# Patient Record
Sex: MALE | Race: OTHER | Employment: UNEMPLOYED | URBAN - METROPOLITAN AREA
[De-identification: names, ages, dates, MRNs, and addresses within clinical notes are randomized per-mention and may not be internally consistent; named-entity substitution may affect disease eponyms.]

---

## 2017-12-04 ENCOUNTER — HOSPITAL ENCOUNTER (EMERGENCY)
Age: 57
Discharge: HOME | End: 2017-12-04
Attending: EMERGENCY MEDICINE
Payer: MEDICARE

## 2017-12-04 ENCOUNTER — APPOINTMENT (OUTPATIENT)
Dept: GENERAL RADIOLOGY | Age: 57
End: 2017-12-04
Attending: NURSE PRACTITIONER
Payer: MEDICARE

## 2017-12-04 VITALS
BODY MASS INDEX: 36.56 KG/M2 | WEIGHT: 213 LBS | DIASTOLIC BLOOD PRESSURE: 88 MMHG | TEMPERATURE: 97.8 F | SYSTOLIC BLOOD PRESSURE: 119 MMHG | HEART RATE: 81 BPM | RESPIRATION RATE: 16 BRPM | OXYGEN SATURATION: 95 %

## 2017-12-04 DIAGNOSIS — J20.9 ACUTE BRONCHITIS, UNSPECIFIED ORGANISM: Primary | ICD-10-CM

## 2017-12-04 PROCEDURE — 71020 XR CHEST PA LAT: CPT

## 2017-12-04 PROCEDURE — 99282 EMERGENCY DEPT VISIT SF MDM: CPT | Performed by: NURSE PRACTITIONER

## 2017-12-04 RX ORDER — CODEINE PHOSPHATE AND GUAIFENESIN 10; 100 MG/5ML; MG/5ML
5 SOLUTION ORAL
Qty: 60 ML | Refills: 0 | Status: SHIPPED | OUTPATIENT
Start: 2017-12-04 | End: 2018-01-24 | Stop reason: ALTCHOICE

## 2017-12-04 RX ORDER — AMOXICILLIN 500 MG/1
500 TABLET, FILM COATED ORAL 3 TIMES DAILY
Qty: 21 TAB | Refills: 0 | Status: SHIPPED | OUTPATIENT
Start: 2017-12-04 | End: 2018-01-24 | Stop reason: ALTCHOICE

## 2017-12-04 RX ORDER — FLUTICASONE PROPIONATE 50 MCG
2 SPRAY, SUSPENSION (ML) NASAL DAILY
Qty: 1 BOTTLE | Refills: 0 | Status: SHIPPED | OUTPATIENT
Start: 2017-12-04

## 2017-12-04 NOTE — DISCHARGE INSTRUCTIONS
Bronquitis: Instrucciones de cuidado - [ Bronchitis: Care Instructions ]  Instrucciones de cuidado    La bronquitis es lashell inflamación de los bronquios (conductos bronquiales), que llevan aire a los pulmones. Los tubos se hinchan y producen mucosidad, o flema. La mucosidad y los bronquios inflamados hacen que tosa. Es posible que tenga problemas para respirar. Madalynn Irene de los casos de bronquitis son causados por virus barb los que causan los resfriados. Los antibióticos generalmente no ayudan y pueden ser dañinos. La bronquitis suele aparecer con Doristine Lout y dura alrededor de 2 a 3 semanas en personas que por lo demás son saludables. La atención de seguimiento es lashell parte clave de porter tratamiento y seguridad. Asegúrese de hacer y acudir a todas las citas, y llame a porter médico si está teniendo problemas. También es lashell buena idea saber los resultados de los exámenes y mantener lashell lista de los medicamentos que сергей. ¿Cómo puede cuidarse en el hogar? · [employer] medicamentos exactamente barb le fueron recetados. Llame a porter médico si laz que está teniendo un problema con eduarda medicamentos. · Descanse un poco más. · [place] un analgésico (medicamento para el dolor) de venta terri, barb acetaminofén (Tylenol), ibuprofeno (Advil, Motrin) o naproxeno (Aleve), para reducir la fiebre y [employer] sujatha en el cuerpo. Anna y siga todas las instrucciones de la [place]. · No tome dos o más analgésicos al mismo tiempo a menos que el médico se lo haya indicado. Muchos analgésicos contienen acetaminofén, es decir, Tylenol. El exceso de acetaminofén (Tylenol) puede ser dañino. · Prosperity un medicamento para la tos de venta terri que contenga dextrometorfano para ayudar a aliviar la tos seca y persistente y así poder dormir. Evite los medicamentos para la tos que tengan más de un ingrediente [place]. Anna y siga todas las instrucciones de la [place].   · Respire aire húmedo de un humidificador, ducha caliente o lavabo lleno de [place]. El calor y la humedad adelgazarán la mucosidad para que pueda expulsarla. · No fume. Fumar puede empeorar la bronquitis. Si necesita ayuda para dejar de fumar, hable con porter médico sobre programas y medicamentos para dejar de fumar. Estos pueden aumentar eduarda probabilidades de dejar el hábito para siempre. ¿Cuándo debe pedir ayuda? Llame al 911 en cualquier momento que considere que necesita atención de emergencia. Por ejemplo, llame si:  ? · [address] dificultades para respirar. ? Llame a porter médico ahora mismo o busque atención médica inmediata si:  ? · Tiene nueva o peor dificultad para respirar. ? · Tose mucosidad (esputo) de color café oscuro o con elijah. ? · Tiene lashell fiebre nueva o más wandy. ? · Tiene un salpullido nuevo. ?Preste especial atención a los cambios en porter giovanny y asegúrese de comunicarse con porter médico si:  ? · Porter tos es más profunda o más frecuente que antes, especialmente si nota más mucosidad o un cambio en el color de la mucosidad. ? · No mejora barb se esperaba. ¿Dónde puede encontrar más información en inglés? Peter Polly a http://therese-sandra.info/. Escriba H333 en la búsqueda para aprender más acerca de \"Bronquitis: Instrucciones de cuidado - [ Bronchitis: Care Instructions ]. \"  Revisado: 12 jolly, 2017  Versión del contenido: 11.4  © 8596-1886 Healthwise, Incorporated. Las instrucciones de cuidado fueron adaptadas bajo licencia por [employer] (which disclaims liability or warranty for this information). Si usted tiene [place] afección médica o sobre estas instrucciones, siempre pregunte a porter profesional de giovanny. [facility] niega toda garantía o responsabilidad por porter uso de esta información.

## 2017-12-04 NOTE — PROGRESS NOTES
present to cover any requests in the Emergency Room. Thank you for this referral,      Yessica Toro, [address] [facility]  /Patient [facility].  [address] [place] [address]    [phone]

## 2017-12-04 NOTE — ED NOTES
I have reviewed discharge instructions with the patient. The patient verbalized understanding. Patient left ED via Discharge Method: ambulatory to Home with spouse. Opportunity for questions and clarification provided. Patient given 2 scripts. To continue your aftercare when you leave the hospital, you may receive an automated call from our care team to check in on how you are doing. This is a free service and part of our promise to provide the best care and service to meet your aftercare needs.  If you have questions, or wish to unsubscribe from this service please call [phone]. Thank you for Choosing our [facility].

## 2017-12-04 NOTE — ED PROVIDER NOTES
HPI Comments: Patient presents with cough, congestion, sore throat for the past week. He states back soreness from coughing. He states history of pneumonia. He denies fever. Patient is a 62 y.o. male presenting with cough. The history is provided by the patient. Cough   This is a new problem. The current episode started more than 1 week ago. The problem occurs constantly. The problem has not changed since onset. The cough is non-productive. There has been no fever. Associated symptoms include rhinorrhea, sore throat and myalgias. Pertinent negatives include no chest pain, no chills, no sweats, no weight loss, no eye redness, no ear congestion, no ear pain, no headaches, no shortness of breath, no wheezing, no nausea, no vomiting and no confusion. He has tried nothing for the symptoms. His past medical history is significant for pneumonia.         Past Medical History:   Diagnosis Date    Allergic rhinitis     Anxiety and depression     Asthma     Bronchiectasis ([address]     Claustrophobia     Constipation     Diabetes (HCC)     Dyslipidemia     Dyspnea     Gall bladder disease     GERD (gastroesophageal reflux disease)     Hepatic steatosis     Hiatal hernia     History of tobacco use     Hyperlipidemia     Hypertension     Insomnia     Obesity, unspecified     (BMI 30-39.9; WEIGHT MANAGEMENT)    Obstructive sleep apnea     Osteopenia     Pulmonary nodule     Sinusitis     Snoring     SOB (shortness of breath)     Urinary hesitancy        Past Surgical History:   Procedure Laterality Date    HX ABDOMINAL WALL DEFECT REPAIR  1975    knife wound repair    HX CHOLECYSTECTOMY  1/15/15         Family History:   Problem Relation Age of Onset    Diabetes Mother     Diabetes Father     Diabetes Brother     Asthma Daughter        Social History     Social History    Marital status:      Spouse name: N/A    Number of children: N/A    Years of education: N/A     Occupational History        Disabled, worked for 15 years as a  with no industrial toxin exposure     Social History Main Topics    Smoking status: Former Smoker     Packs/day: 0.50     Years: 10.00     Types: Cigarettes     Quit date: 1/14/1995    Smokeless tobacco: Never Used      Comment: QUIT ALMOST 21 YEARS AGO     Alcohol use No    Drug use: No    Sexual activity: Not on file     Other Topics Concern    Not on file     Social History Narrative    20-pack-year history of cigarette smoking, stopped smoking in 1995. Worked as a  for 15 years, denies industrial toxin exposure history. Presently on disability. , 2 children, one has asthma, also an adopted child. Denies alcohol use. He has lived in the [place] visiting from [address] [place] [address] at age 13, lives first in [place], [place] and now in [place]. Has chickens and ducks, no history of pet bird, has pet dog. ALLERGIES: Benzonatate and Pork/porcine containing products    Review of Systems   Constitutional: Negative for chills, fever and weight loss. HENT: Positive for congestion, rhinorrhea and sore throat. Negative for ear pain. Eyes: Negative for redness. Respiratory: Positive for cough. Negative for shortness of breath and wheezing. Cardiovascular: Negative for chest pain. Gastrointestinal: Negative for abdominal pain, constipation, diarrhea, nausea and vomiting. Musculoskeletal: Positive for myalgias. Negative for arthralgias. Neurological: Negative for dizziness, weakness and headaches. Psychiatric/Behavioral: Negative for confusion. Vitals:    12/04/17 1211   BP: 119/88   Pulse: 81   Resp: 16   Temp: 97.8 °F (36.6 °C)   SpO2: 95%   Weight: 96.6 kg (213 lb)            Physical Exam   Constitutional: He is oriented to person, place, and time. He appears well-developed and well-nourished. No distress. HENT:   Right Ear: Tympanic membrane is erythematous.  Tympanic membrane is not injected. A middle ear effusion is present. Left Ear: Tympanic membrane is erythematous. Tympanic membrane is not injected. A middle ear effusion is present. Nose: Mucosal edema present. Mouth/Throat: Uvula is midline, oropharynx is clear and moist and mucous membranes are normal.   Cardiovascular: Normal rate and regular rhythm. No murmur heard. Pulmonary/Chest: Effort normal and breath sounds normal.   Abdominal: Soft. Bowel sounds are normal.   Musculoskeletal: Normal range of motion. Neurological: He is alert and oriented to person, place, and time. Skin: Skin is warm and dry. No rash noted. He is not diaphoretic. No erythema. Psychiatric: He has a normal mood and affect. His behavior is normal.   Nursing note and vitals reviewed. Xr Chest Pa Lat    Result Date: 12/4/2017  Chest 2 view CLINICAL INDICATION: 2 weeks of persisting cough and chest pain radiating to back COMPARISON: 1/21/2015, also CT 2/17/2015 TECHNIQUE: Upright PA and lateral views of the chest FINDINGS: Lung volumes are well inflated. Cardiomediastinal silhouette and hilar contours are stable. The lungs demonstrating no consolidation, effusion, pneumothorax or pulmonary edema. There is minimal chronic linear scarring or atelectasis in the left base similar to prior. No acute osseous abnormalities are seen. IMPRESSION: No acute disease. MDM  Number of Diagnoses or Management Options  Acute bronchitis, unspecified organism:   Diagnosis management comments: Chest xray negative for pneumonia. Patient given prescriptions for Cheratussin, Flonase, and amoxicillin.         Amount and/or Complexity of Data Reviewed  Tests in the radiology section of CPT®: ordered and reviewed    Patient Progress  Patient progress: stable    ED Course       Procedures

## 2018-02-22 ENCOUNTER — HOSPITAL ENCOUNTER (EMERGENCY)
Age: 58
Discharge: HOME | End: 2018-02-22
Attending: EMERGENCY MEDICINE
Payer: MEDICARE

## 2018-02-22 ENCOUNTER — APPOINTMENT (OUTPATIENT)
Dept: GENERAL RADIOLOGY | Age: 58
End: 2018-02-22
Attending: EMERGENCY MEDICINE
Payer: MEDICARE

## 2018-02-22 VITALS
SYSTOLIC BLOOD PRESSURE: 130 MMHG | HEART RATE: 72 BPM | WEIGHT: 215 LBS | HEIGHT: 63 IN | DIASTOLIC BLOOD PRESSURE: 78 MMHG | BODY MASS INDEX: 38.09 KG/M2 | RESPIRATION RATE: 18 BRPM | TEMPERATURE: 98 F | OXYGEN SATURATION: 98 %

## 2018-02-22 DIAGNOSIS — J20.9 ACUTE BRONCHITIS, UNSPECIFIED ORGANISM: Primary | ICD-10-CM

## 2018-02-22 LAB
FLUAV AG NPH QL IA: NEGATIVE
FLUBV AG NPH QL IA: NEGATIVE

## 2018-02-22 PROCEDURE — 87804 INFLUENZA ASSAY W/OPTIC: CPT | Performed by: EMERGENCY MEDICINE

## 2018-02-22 PROCEDURE — 71046 X-RAY EXAM CHEST 2 VIEWS: CPT

## 2018-02-22 PROCEDURE — 99283 EMERGENCY DEPT VISIT LOW MDM: CPT | Performed by: EMERGENCY MEDICINE

## 2018-02-22 RX ORDER — GUAIFENESIN/DEXTROMETHORPHAN 100-10MG/5
5 SYRUP ORAL
Qty: 150 ML | Refills: 0 | Status: SHIPPED | OUTPATIENT
Start: 2018-02-22 | End: 2018-03-04

## 2018-02-22 RX ORDER — AMOXICILLIN AND CLAVULANATE POTASSIUM 875; 125 MG/1; MG/1
1 TABLET, FILM COATED ORAL 2 TIMES DAILY
Qty: 14 TAB | Refills: 0 | Status: SHIPPED | OUTPATIENT
Start: 2018-02-22 | End: 2018-03-01

## 2018-02-22 NOTE — DISCHARGE INSTRUCTIONS
Return with any fevers, vomiting, difficulty breathing, worsening symptoms, or distal concerns. It is important for you to follow up with your primary care doctor on Monday or Tuesday for reevaluation. Bronquitis: Instrucciones de cuidado - [ Bronchitis: Care Instructions ]  Instrucciones de cuidado    La bronquitis es lashell inflamación de los bronquios (conductos bronquiales), que llevan aire a los pulmones. Los tubos se hinchan y producen mucosidad, o flema. La mucosidad y los bronquios inflamados hacen que tosa. Es posible que tenga problemas para respirar. Maryhelen Rozina de los casos de bronquitis son causados por virus barb los que causan los resfriados. Los antibióticos generalmente no ayudan y pueden ser dañinos. La bronquitis suele aparecer con Aiyana Chough y dura alrededor de 2 a 3 semanas en personas que por lo demás son saludables. La atención de seguimiento es lashell parte clave de porter tratamiento y seguridad. Asegúrese de hacer y acudir a todas las citas, y llame a porter médico si está teniendo problemas. También es lashell buena idea saber los resultados de los exámenes y mantener lashell lista de los medicamentos que сергей. ¿Cómo puede cuidarse en el hogar? · [employer] medicamentos exactamente barb le fueron recetados. Llame a potrer médico si laz que está teniendo un problema con eduarda medicamentos. · Descanse un poco más. · [place] un analgésico (medicamento para el dolor) de venta terri, barb acetaminofén (Tylenol), ibuprofeno (Advil, Motrin) o naproxeno (Aleve), para reducir la fiebre y [employer] sujatha en el cuerpo. Anna y siga todas las instrucciones de la [place]. · No tome dos o más analgésicos al mismo tiempo a menos que el médico se lo haya indicado. Muchos analgésicos contienen acetaminofén, es decir, Tylenol. El exceso de acetaminofén (Tylenol) puede ser dañino.   · [place] un medicamento para la tos de venta terri que contenga dextrometorfano para ayudar a aliviar la tos seca y persistente y así poder dormir. Evite los medicamentos para la tos que tengan más de un ingrediente [place]. Anna y siga todas las instrucciones de la [place]. · Respire aire húmedo de un humidificador, ducha caliente o lavabo lleno de [place]. El calor y la humedad adelgazarán la mucosidad para que pueda expulsarla. · No fume. Fumar puede empeorar la bronquitis. Si necesita ayuda para dejar de fumar, hable con porter médico sobre programas y medicamentos para dejar de fumar. Estos pueden aumentar eduarda probabilidades de dejar el hábito para siempre. ¿Cuándo debe pedir ayuda? Llame al 911 en cualquier momento que considere que necesita atención de emergencia. Por ejemplo, llame si:  ? · [address] dificultades para respirar. ? Llame a porter médico ahora mismo o busque atención médica inmediata si:  ? · Tiene nueva o peor dificultad para respirar. ? · Tose mucosidad (esputo) de color café oscuro o con elijah. ? · Tiene lashell fiebre nueva o más wandy. ? · Tiene un salpullido nuevo. ?Preste especial atención a los cambios en porter giovanny y asegúrese de comunicarse con porter médico si:  ? · Porter tos es más profunda o más frecuente que antes, especialmente si nota más mucosidad o un cambio en el color de la mucosidad. ? · No mejora barb se esperaba. ¿Dónde puede encontrar más información en inglés? Joby Presume a http://[email or web address]-sandra.info/. Escriba H333 en la búsqueda para aprender más acerca de \"Bronquitis: Instrucciones de cuidado - [ Bronchitis: Care Instructions ]. \"  Revisado: 12 [place], 2017  Versión del contenido: 11.4  © 0855-9751 Healthwise, Incorporated. Las instrucciones de cuidado fueron adaptadas bajo licencia por [employer] (which disclaims liability or warranty for this information). Si usted tiene [place] afección médica o sobre estas instrucciones, siempre pregunte a porter profesional de giovanny.  Healthwise, Incorporated niega toda garantía o responsabilidad por porter uso de esta información.

## 2018-02-22 NOTE — ED TRIAGE NOTES
C/o generalized body aches, productive cough with white sputum, chills. States chest and back pain with cough. Denies n/v/d. Onset couple days pta.  States hx pneumonia couple years pta

## 2018-02-22 NOTE — ED PROVIDER NOTES
HPI Comments: 62year old gentleman presents with concerns about a cough and some body aches that have been present for about 4 or 5 days. He says his cough has been non-productive. He does note a few occasional fevers and chills. He's had no significant vomiting or diarrhea. Elements of this note were created using speech recognition software. As such, errors of speech recognition may be present. Patient is a 62 y.o. male presenting with general illness. The history is provided by the patient. Generalized Body Aches   Associated symptoms include shortness of breath. Pertinent negatives include no chest pain, no abdominal pain and no headaches.         Past Medical History:   Diagnosis Date    Allergic rhinitis     Anxiety and depression     Asthma     Bronchiectasis ([address]     Claustrophobia     Constipation     Diabetes (HCC)     Dyslipidemia     Dyspnea     Gall bladder disease     GERD (gastroesophageal reflux disease)     Hepatic steatosis     Hiatal hernia     History of tobacco use     Hyperlipidemia     Hypertension     Insomnia     Obesity, unspecified     (BMI 30-39.9; WEIGHT MANAGEMENT)    Obstructive sleep apnea     Osteopenia     Pulmonary nodule     Sinusitis     Snoring     SOB (shortness of breath)     Urinary hesitancy        Past Surgical History:   Procedure Laterality Date    HX ABDOMINAL WALL DEFECT REPAIR  1975    knife wound repair    HX CHOLECYSTECTOMY  1/15/15         Family History:   Problem Relation Age of Onset    Diabetes Mother     Diabetes Father     Diabetes Brother     Asthma Daughter        Social History     Social History    Marital status:      Spouse name: N/A    Number of children: N/A    Years of education: N/A     Occupational History          Disabled, worked for 15 years as a  with no industrial toxin exposure     Social History Main Topics    Smoking status: Former Smoker     Packs/day: 0.50 Years: 10.00     Types: Cigarettes     Quit date: 1/14/1995    Smokeless tobacco: Never Used      Comment: QUIT ALMOST 21 YEARS AGO     Alcohol use No    Drug use: No    Sexual activity: Not on file     Other Topics Concern    Not on file     Social History Narrative    20-pack-year history of cigarette smoking, stopped smoking in 1995. Worked as a  for 15 years, denies industrial toxin exposure history. Presently on disability. , 2 children, one has asthma, also an adopted child. Denies alcohol use. He has lived in the [place] visiting from [facility] at age 13, lives first in [place], [place] and now in [place]. Has chickens and ducks, no history of pet bird, has pet dog. ALLERGIES: Benzonatate and Pork/porcine containing products    Review of Systems   Constitutional: Negative for chills, diaphoresis and fever. HENT: Negative for congestion, rhinorrhea and sore throat. Eyes: Negative for redness and visual disturbance. Respiratory: Positive for cough and shortness of breath. Negative for chest tightness and wheezing. Cardiovascular: Negative for chest pain and palpitations. Gastrointestinal: Negative for abdominal pain, blood in stool, diarrhea, nausea and vomiting. Endocrine: Negative for polydipsia and polyuria. Genitourinary: Negative for dysuria and hematuria. Musculoskeletal: Positive for myalgias. Negative for arthralgias and neck stiffness. Skin: Negative for rash. Allergic/Immunologic: Negative for environmental allergies and food allergies. Neurological: Negative for dizziness, weakness and headaches. Hematological: Negative for adenopathy. Does not bruise/bleed easily. Psychiatric/Behavioral: Negative for confusion and sleep disturbance. The patient is not nervous/anxious.         Vitals:    02/22/18 1115 02/22/18 1235   BP: 127/87 130/78   Pulse: 73 72   Resp: 20 18   Temp: 97.9 °F (36.6 °C) 98 °F (36.7 °C)   SpO2: 95% 98%   Weight: 97.5 kg (215 lb)    Height: 5' 3\" (1.6 m)             Physical Exam   Constitutional: He is oriented to person, place, and time. He appears well-developed and well-nourished. HENT:   Head: Normocephalic and atraumatic. Eyes: Conjunctivae and EOM are normal. Pupils are equal, round, and reactive to light. Neck: Normal range of motion. Cardiovascular: Normal rate and regular rhythm. Pulmonary/Chest: Effort normal and breath sounds normal. No respiratory distress. He has no wheezes. He has no rales. He exhibits no tenderness. Abdominal: Soft. Bowel sounds are normal. There is no rebound and no guarding. Musculoskeletal: Normal range of motion. He exhibits no edema or tenderness. Lymphadenopathy:     He has no cervical adenopathy. Neurological: He is alert and oriented to person, place, and time. Skin: Skin is warm and dry. Psychiatric: He has a normal mood and affect. Nursing note and vitals reviewed. MDM  Number of Diagnoses or Management Options  Acute bronchitis, unspecified organism:   Diagnosis management comments: Patient's symptoms seem more like a bronchitis than the flu. I will treat for that.         ED Course       Procedures

## 2018-02-22 NOTE — ED NOTES
I have reviewed discharge instructions with the patient. The patient verbalized understanding. Patient left ED via Discharge Method: ambulatory to home with (wife). Opportunity for questions and clarification provided. Patient given 2 escripts. To continue your aftercare when you leave the hospital, you may receive an automated call from our care team to check in on how you are doing. This is a free service and part of our promise to provide the best care and service to meet your aftercare needs.  If you have questions, or wish to unsubscribe from this service please call [phone]. Thank you for Choosing our [facility] Emergency Department.

## 2018-07-03 ENCOUNTER — APPOINTMENT (OUTPATIENT)
Dept: GENERAL RADIOLOGY | Age: 58
End: 2018-07-03
Attending: EMERGENCY MEDICINE
Payer: MEDICARE

## 2018-07-03 ENCOUNTER — HOSPITAL ENCOUNTER (EMERGENCY)
Age: 58
Discharge: HOME | End: 2018-07-03
Attending: EMERGENCY MEDICINE
Payer: MEDICARE

## 2018-07-03 VITALS
BODY MASS INDEX: 38.09 KG/M2 | DIASTOLIC BLOOD PRESSURE: 71 MMHG | TEMPERATURE: 97.6 F | SYSTOLIC BLOOD PRESSURE: 117 MMHG | HEART RATE: 78 BPM | RESPIRATION RATE: 18 BRPM | OXYGEN SATURATION: 93 % | HEIGHT: 63 IN | WEIGHT: 215 LBS

## 2018-07-03 DIAGNOSIS — R42 DIZZINESS: ICD-10-CM

## 2018-07-03 DIAGNOSIS — R11.2 NAUSEA AND VOMITING, INTRACTABILITY OF VOMITING NOT SPECIFIED, UNSPECIFIED VOMITING TYPE: Primary | ICD-10-CM

## 2018-07-03 LAB
ALBUMIN SERPL-MCNC: 4 G/DL (ref 3.5–5)
ALBUMIN/GLOB SERPL: 1 {RATIO} (ref 1.2–3.5)
ALP SERPL-CCNC: 89 U/L (ref 50–136)
ALT SERPL-CCNC: 60 U/L (ref 12–65)
ANION GAP SERPL CALC-SCNC: 10 MMOL/L (ref 7–16)
AST SERPL-CCNC: 28 U/L (ref 15–37)
BASOPHILS # BLD: 0 K/UL (ref 0–0.2)
BASOPHILS NFR BLD: 1 % (ref 0–2)
BILIRUB SERPL-MCNC: 0.5 MG/DL (ref 0.2–1.1)
BUN SERPL-MCNC: 12 MG/DL (ref 6–23)
CALCIUM SERPL-MCNC: 8.9 MG/DL (ref 8.3–10.4)
CHLORIDE SERPL-SCNC: 106 MMOL/L (ref 98–107)
CO2 SERPL-SCNC: 26 MMOL/L (ref 21–32)
CREAT SERPL-MCNC: 0.88 MG/DL (ref 0.8–1.5)
DIFFERENTIAL METHOD BLD: ABNORMAL
EOSINOPHIL # BLD: 0.2 K/UL (ref 0–0.8)
EOSINOPHIL NFR BLD: 2 % (ref 0.5–7.8)
ERYTHROCYTE [DISTWIDTH] IN BLOOD BY AUTOMATED COUNT: 13.2 % (ref 11.9–14.6)
GLOBULIN SER CALC-MCNC: 4.2 G/DL (ref 2.3–3.5)
GLUCOSE SERPL-MCNC: 102 MG/DL (ref 65–100)
HCT VFR BLD AUTO: 47.9 % (ref 41.1–50.3)
HGB BLD-MCNC: 17 G/DL (ref 13.6–17.2)
IMM GRANULOCYTES # BLD: 0 K/UL (ref 0–0.5)
IMM GRANULOCYTES NFR BLD AUTO: 0 % (ref 0–5)
LIPASE SERPL-CCNC: 112 U/L (ref 73–393)
LYMPHOCYTES # BLD: 2 K/UL (ref 0.5–4.6)
LYMPHOCYTES NFR BLD: 31 % (ref 13–44)
MCH RBC QN AUTO: 31.5 PG (ref 26.1–32.9)
MCHC RBC AUTO-ENTMCNC: 35.5 G/DL (ref 31.4–35)
MCV RBC AUTO: 88.7 FL (ref 79.6–97.8)
MONOCYTES # BLD: 0.5 K/UL (ref 0.1–1.3)
MONOCYTES NFR BLD: 8 % (ref 4–12)
NEUTS SEG # BLD: 3.7 K/UL (ref 1.7–8.2)
NEUTS SEG NFR BLD: 58 % (ref 43–78)
PLATELET # BLD AUTO: 281 K/UL (ref 150–450)
PMV BLD AUTO: 9.6 FL (ref 10.8–14.1)
POTASSIUM SERPL-SCNC: 3.3 MMOL/L (ref 3.5–5.1)
PROT SERPL-MCNC: 8.2 G/DL (ref 6.3–8.2)
RBC # BLD AUTO: 5.4 M/UL (ref 4.23–5.67)
SODIUM SERPL-SCNC: 142 MMOL/L (ref 136–145)
WBC # BLD AUTO: 6.4 K/UL (ref 4.3–11.1)

## 2018-07-03 PROCEDURE — 83690 ASSAY OF LIPASE: CPT | Performed by: EMERGENCY MEDICINE

## 2018-07-03 PROCEDURE — 85025 COMPLETE CBC W/AUTO DIFF WBC: CPT | Performed by: EMERGENCY MEDICINE

## 2018-07-03 PROCEDURE — 74019 RADEX ABDOMEN 2 VIEWS: CPT

## 2018-07-03 PROCEDURE — 81003 URINALYSIS AUTO W/O SCOPE: CPT | Performed by: EMERGENCY MEDICINE

## 2018-07-03 PROCEDURE — 74011250636 HC RX REV CODE- 250/636: Performed by: EMERGENCY MEDICINE

## 2018-07-03 PROCEDURE — 80053 COMPREHEN METABOLIC PANEL: CPT | Performed by: EMERGENCY MEDICINE

## 2018-07-03 PROCEDURE — 74011000250 HC RX REV CODE- 250: Performed by: EMERGENCY MEDICINE

## 2018-07-03 PROCEDURE — 96360 HYDRATION IV INFUSION INIT: CPT | Performed by: EMERGENCY MEDICINE

## 2018-07-03 PROCEDURE — 99284 EMERGENCY DEPT VISIT MOD MDM: CPT | Performed by: EMERGENCY MEDICINE

## 2018-07-03 PROCEDURE — 74011250637 HC RX REV CODE- 250/637: Performed by: EMERGENCY MEDICINE

## 2018-07-03 RX ORDER — LIDOCAINE HYDROCHLORIDE 20 MG/ML
15 SOLUTION OROPHARYNGEAL
Status: COMPLETED | OUTPATIENT
Start: 2018-07-03 | End: 2018-07-03

## 2018-07-03 RX ORDER — DICYCLOMINE HYDROCHLORIDE 10 MG/1
10 CAPSULE ORAL 4 TIMES DAILY
Qty: 20 CAP | Refills: 0 | Status: SHIPPED | OUTPATIENT
Start: 2018-07-03 | End: 2018-07-08

## 2018-07-03 RX ORDER — ONDANSETRON 4 MG/1
4 TABLET, ORALLY DISINTEGRATING ORAL
Qty: 12 TAB | Refills: 0 | Status: SHIPPED | OUTPATIENT
Start: 2018-07-03

## 2018-07-03 RX ADMIN — LIDOCAINE HYDROCHLORIDE 15 ML: 20 SOLUTION ORAL; TOPICAL at 17:57

## 2018-07-03 RX ADMIN — SODIUM CHLORIDE 1000 ML: 900 INJECTION, SOLUTION INTRAVENOUS at 18:28

## 2018-07-03 RX ADMIN — Medication 30 ML: at 17:57

## 2018-07-03 NOTE — ED NOTES
I have reviewed discharge instructions with the patient and spouse. The patient and spouse verbalized understanding. Patient left ED via Discharge Method: ambulatory to Home with transport from wife. The patient is ambulatory upon exit and appears in no acute distress. The patient has been provided discharge instructions, follow up information, and prescriptions x2. The patient and wife do not have any questions at this time. Opportunity for questions and clarification provided. Patient given 2 scripts. To continue your aftercare when you leave the hospital, you may receive an automated call from our care team to check in on how you are doing. This is a free service and part of our promise to provide the best care and service to meet your aftercare needs.  If you have questions, or wish to unsubscribe from this service please call [phone]. Thank you for Choosing our [facility] Emergency Department.

## 2018-07-03 NOTE — DISCHARGE INSTRUCTIONS
Mareos: Instrucciones de cuidado - [ Dizziness: Care Instructions ]  Instrucciones de cuidado  Los mareos son [place] sensación de inestabilidad o confusión en la morena. Son distintos al vértigo, lashell sensación de que la habitación gira o de que usted se mueve o . También es distinto del aturdimiento, que es la sensación de que está a punto de desmayarse. Puede resultar difícil conocer la causa de los [place]. Algunas personas se sienten mareadas cuando tienen migrañas. A veces, los episodios gripales pueden hacer que se sienta mareado. Algunas afecciones médicas, barb los problemas cardíacos o la presión arterial wandy, pueden hacer que se sienta mareado. Muchos medicamentos pueden causar mareos, barb los que se usan para la presión arterial wandy, el dolor o la ansiedad. Si es un medicamento el que está causando los síntomas, porter médico podría recomendarle que lo cambie o deje de tomarlo. Si es un problema cardíaco, podría necesitar medicamentos para ayudar a que porter corazón funcione mejor. Si no hay razón aparente para los síntomas, porter médico podría sugerir vigilar y esperar eun un tiempo para perfecto si los mareos desaparecen por sí solos. La atención de seguimiento es lashell parte clave de porter tratamiento y seguridad. Asegúrese de hacer y acudir a todas las citas, y llame a porter médico si está teniendo problemas. También es lashell buena idea saber los resultados de los exámenes y mantener lashell lista de los medicamentos que сергей. ¿Cómo puede cuidarse en el hogar? · Si porter médico le recomienda o receta medicamentos, tómelos exactamente según las indicaciones. Llame a porter médico si laz estar teniendo un problema con porter medicamento. · No conduzca mientras se sienta mareado. · Trate de prevenir las caídas. Algunas medidas que puede gene son:  Merline Garre Usar tapetes antideslizantes, agregar agarraderas cerca de la isa y usar luces nocturnas.   ¨ Ordenar porter casa de jacki manera que en los senderos no haya nada con lo que se pueda tropezar. ¨ Avisarles a familiares y [facility] que se ha estado sintiendo [place]. Guin les servirá para saber cómo ayudarle. ¿Cuándo debe pedir ayuda? Llame al 911 en cualquier momento que considere que necesita atención de [place]. Por ejemplo, llame si:  ? · Se desmayó (perdió el conocimiento). ? · Tiene mareos junto con síntomas de un ataque cardíaco. Estos pueden incluir:  ¨ Dolor de pecho, o presión o lashell sensación extraña en el pecho. ¨ Sudoración. ¨ Falta de aire. ¨ Náuseas o vómito. ¨ Dolor, presión, o lashell sensación extraña en la espalda, el elisha, la mandíbula o el abdomen superior, o en honorio o ambos hombros o brazos. ¨ Aturdimiento o debilidad repentina. ¨ Un latido cardíaco rápido o irregular. ? · Tiene síntomas de un ataque cerebral. Estos pueden incluir:  ¨ Entumecimiento, hormigueo, debilidad o parálisis repentinos en la miguel, el brazo o la pierna, sobre todo si ocurre en un solo lado del cuerpo. ¨ Cambios súbitos en la vista. ¨ Problemas repentinos para hablar. ¨ Confusión súbita o dificultad repentina para comprender frases sencillas. ¨ Problemas repentinos para caminar o mantener el equilibrio. ¨ Un dolor de morena intenso y repentino, distinto a los sujatha de morena anteriores. ?Llame a porter médico ahora mismo o busque atención médica inmediata si:  ? · Se siente mareado y tiene Lizzieocłdamon, laquita de Martine o zumbido [place] oídos. ? · Tiene nuevas náuseas y vómito o [address] ? · Rafia mareos no desaparecen o regresan. ?Preste especial atención a los cambios en porter giovanny y asegúrese de comunicarse con porter médico si:  ? · No mejora barb se esperaba. ¿Dónde puede encontrar más información en inglés? [place] a http://therese-sandra.info/. Ami Etienne [id number] en la búsqueda para aprender más acerca de \"Mareos: Instrucciones de cuidado - [ Dizziness: Care Instructions ]. \"  Revisado: 20 Richard Dallas 2017  Versión del contenido: 11.4  © 3136-0169 Healthwise, Incorporated.  Taras Hong instrucciones de cuidado fueron adaptadas bajo licencia por [facility] (which disclaims liability or warranty for this information). Si usted tiene [place] afección médica o sobre estas instrucciones, siempre pregunte a porter profesional de giovanny. [facility] niega toda garantía o responsabilidad por porter uso de esta información. Dolor abdominal: Instrucciones de cuidado - [ Abdominal Pain: Care Instructions ]  Instrucciones de cuidado    El dolor abdominal tiene muchas causas posibles. Algunas de ellas no son graves y mejoran por sí solas en unos días. Otras requieren Radha Mara y [place]. Si porter dolor continúa o [place], necesitará lashell nueva revisión y [place] pruebas para determinar qué pasa. Es posible que necesite cirugía para corregir el problema. No ignore nuevos síntomas, barb fiebre, náuseas y [place], [facility], dolor que [place] o [place]. Podrían ser señales de un problema más grave. Porter médico puede haberle recomendado lashell consulta de Rubio [employer] Kari las 8 o 12 horas siguientes. Si no se siente mejor, es posible que requiera Radha [place] o [place]. El médico lo martínez revisado minuciosamente, zoltan puede carol problemas más tarde. Si nota algún problema o síntomas nuevos, busque tratamiento médico inmediatamente. La atención de seguimiento es lashell parte clave de porter tratamiento y seguridad. Asegúrese de hacer y acudir a todas las citas, y llame a porter médico si está teniendo problemas. También es lashell buena idea saber los resultados de los exámenes y mantener lashell lista de los medicamentos que сергей. ¿Cómo puede cuidarse en el hogar? · Descanse hasta que se sienta mejor. · Para prevenir la deshidratación, kerry abundantes líquidos, suficientes para que porter orina sea de color amarillo marin o transparente barb el agua. Elija beber agua y otros líquidos mag sin cafeína hasta que se sienta mejor.  Si tiene [employer], del corazón o del hígado y tiene que Sue's líquidos, hable con porter médico antes de aumentar porter consumo. · Si tiene [employer], coma alimentos suaves, barb arroz, pan tarun seco o galletas saladas, bananas (plátanos) y puré de Synchari. Trate de comer varias comidas pequeñas al día en lugar de dos o destinee grandes. · Espere hasta 48 horas después de que todos los síntomas hayan desaparecido antes de comer alimentos condimentados, alcohol y bebidas que contengan cafeína. · No consuma alimentos ricos en grasa. · Evite medicamentos antiinflamatorios barb aspirina, ibuprofeno (Advil, Motrin) y naproxeno (Aleve). Pueden causar [employer]. Dígale a porter médico si está tomando aspirina diariamente debido a otro problema de giovanny. ¿Cuándo debe pedir ayuda? Llame al 911 en cualquier momento que considere que necesita atención de emergencia. Por ejemplo, llame si:  ? · Se desmayó (perdió el conocimiento). ? · Las heces son de color rojizo o muy sanguinolentas (con elijah). ? · Vomita elijah o algo parecido a granos de café molido. ? · Tiene dolor abdominal nuevo e intenso. ? Llame a porter médico ahora mismo o busque atención médica inmediata si:  ? · Porter dolor empeora, sobre todo si se concentra en lashell kandis parte del vientre. ? · Vuelve a tener fiebre o tiene fiebre más wandy. ? · Rafia heces son negruzcas y parecidas al alquitrán o tienen rastros de [place]. ? · Tiene sangrado vaginal inesperado. ? · Tiene síntomas de lashell infección del tracto urinario. Estos podrían incluir:  ¨ Dolor al Valentin. ¨ Orinar con más frecuencia que lo habitual.  ¨ Elijah en la Stella nunn. ? · [employer] o aturdimiento, o que está a punto de [place]. ?Preste especial atención a los cambios en porter giovanny y asegúrese de comunicarse con porter médico si:  ? · No está mejorando después de 1 día (24 horas). ¿Dónde puede encontrar más información en inglés? Isma Safer a http://therese-sandra.[email or web address]   Aaron Jansen [id number] en la búsqueda para aprender más acerca de \"Dolor abdominal: Instrucciones de cuidado - [ Abdominal Pain: Care Instructions ]. \"  Revisado: 20 Karen Michael 2017  Versión del contenido: 11.4  © 2709-7411 Healthwise, Incorporated. Las instrucciones de cuidado fueron adaptadas bajo licencia por [facility] (which disclaims liability or warranty for this information). Si usted tiene [place] afección médica o sobre estas instrucciones, siempre pregunte a porter profesional de giovanny. Healthwise, Incorporated niega toda garantía o responsabilidad por porter uso de esta información. Nausea and Vomiting: Care Instructions  Your Care Instructions    When you are nauseated, you may feel weak and sweaty and notice a lot of saliva in your mouth. Nausea often leads to vomiting. Most of the time you do not need to worry about nausea and vomiting, but they can be signs of other illnesses. Two common causes of nausea and vomiting are stomach flu and food poisoning. Nausea and vomiting from viral stomach flu will usually start to improve within 24 hours. Nausea and vomiting from food poisoning may last from 12 to 48 hours. The doctor has checked you carefully, but problems can develop later. If you notice any problems or new symptoms, get medical treatment right away. Follow-up care is a key part of your treatment and safety. Be sure to make and go to all appointments, and call your doctor if you are having problems. It's also a good idea to know your test results and keep a list of the medicines you take. How can you care for yourself at home? · To prevent dehydration, drink plenty of fluids, enough so that your urine is light yellow or clear like water. Choose water and other caffeine-free clear liquids until you feel better. If you have kidney, heart, or liver disease and have to limit fluids, talk with your doctor before you increase the amount of fluids you drink. · Rest in bed until you feel better.   · When you are able to eat, try clear soups, mild foods, and liquids until all symptoms are gone for 12 to 48 hours. Other good choices include dry toast, crackers, cooked cereal, and gelatin dessert, such as Jell-O. When should you call for help? Call 911 anytime you think you may need emergency care. For example, call if:  ? · You passed out (lost consciousness). ?Call your doctor now or seek immediate medical care if:  ? · You have symptoms of dehydration, such as:  ¨ Dry eyes and a dry mouth. ¨ Passing only a little dark urine. ¨ Feeling thirstier than usual.   ? · You have new or worsening belly pain. ? · You have a new or higher fever. ? · You vomit blood or what looks like coffee grounds. ? Watch closely for changes in your health, and be sure to contact your doctor if:  ? · You have ongoing nausea and vomiting. ? · Your vomiting is getting worse. ? · Your vomiting lasts longer than 2 days. ? · You are not getting better as expected. Where can you learn more? Go to http://therese-sandra.info/. Enter [phone] [id number] in the search box to learn more about \"Nausea and Vomiting: Care Instructions. \"  Current as of: March 20, 2017  Content Version: 11.4  © 9663-4442 Healthwise, Incorporated. Care instructions adapted under license by [employer] (which disclaims liability or warranty for this information). If you have questions about a medical condition or this instruction, always ask your healthcare professional. [place] [address] any warranty or liability for your use of this information.

## 2018-07-03 NOTE — ED PROVIDER NOTES
HPI Comments: 63 yo male had a hamburger on Friday and then had multiple episodes of vomiting. Over the weakend he he has had diarrhea and then some abdominal pain. Pain is 8/10. He did take imodium yesterday which helped the diarrhea. Has had cholecystectomy in the past.        Patient is a 62 y.o. male presenting with abdominal pain. The history is provided by the patient. Abdominal Pain    Associated symptoms include diarrhea, nausea and vomiting. Pertinent negatives include no fever, no constipation and no chest pain.         Past Medical History:   Diagnosis Date    Allergic rhinitis     Anxiety and depression     Asthma     Bronchiectasis ([address]     Claustrophobia     Constipation     Diabetes (HCC)     Dyslipidemia     Dyspnea     Gall bladder disease     GERD (gastroesophageal reflux disease)     Hepatic steatosis     Hiatal hernia     History of tobacco use     Hyperlipidemia     Hypertension     Insomnia     Obesity, unspecified     (BMI 30-39.9; WEIGHT MANAGEMENT)    Obstructive sleep apnea     Osteopenia     Pulmonary nodule     Sinusitis     Snoring     SOB (shortness of breath)     Urinary hesitancy        Past Surgical History:   Procedure Laterality Date    HX ABDOMINAL WALL DEFECT REPAIR  1975    knife wound repair    HX CHOLECYSTECTOMY  1/15/15         Family History:   Problem Relation Age of Onset    Diabetes Mother     Diabetes Father     Diabetes Brother     Asthma Daughter        Social History     Social History    Marital status:      Spouse name: N/A    Number of children: N/A    Years of education: N/A     Occupational History          Disabled, worked for 15 years as a  with no industrial toxin exposure     Social History Main Topics    Smoking status: Former Smoker     Packs/day: 0.50     Years: 10.00     Types: Cigarettes     Quit date: 1/14/1995    Smokeless tobacco: Never Used      Comment: QUIT ALMOST 20 YEARS AGO     Alcohol use No    Drug use: No    Sexual activity: Not on file     Other Topics Concern    Not on file     Social History Narrative    20-pack-year history of cigarette smoking, stopped smoking in 1995. Worked as a  for 15 years, denies industrial toxin exposure history. Presently on disability. , 2 children, one has asthma, also an adopted child. Denies alcohol use. He has lived in the [facility] [place] from [facility] at age 13, lives first in [place], [place] and now in [place]. Has chickens and ducks, no history of pet bird, has pet dog. ALLERGIES: Benzonatate and Pork/porcine containing products    Review of Systems   Constitutional: Negative for chills and fever. Respiratory: Negative for cough, chest tightness and wheezing. Cardiovascular: Negative for chest pain and palpitations. Gastrointestinal: Positive for abdominal pain, diarrhea, nausea and vomiting. Negative for abdominal distention, anal bleeding, blood in stool and constipation. Skin: Negative. All other systems reviewed and are negative. Vitals:    07/03/18 1551 07/03/18 1707 07/03/18 1758 07/03/18 1829   BP: 123/84  110/71 111/74   Pulse: 78      Resp: 18      Temp: 97.6 °F (36.4 °C)      SpO2: 97% 96% 93% 93%   Weight: 97.5 kg (215 lb)      Height: 5' 3\" (1.6 m)               Physical Exam   Constitutional: He is oriented to person, place, and time. He appears well-developed and well-nourished. No distress. HENT:   Head: Normocephalic and atraumatic. Eyes: Conjunctivae are normal. Right eye exhibits no discharge. Left eye exhibits no discharge. Neck: Neck supple. Pulmonary/Chest: Effort normal. No stridor. No respiratory distress. Abdominal: Soft. He exhibits no distension. There is tenderness (epigastric region. ). There is no rebound and no guarding. Neurological: He is alert and oriented to person, place, and time.    No focal weakness speech normal Skin: Skin is warm and dry. No rash noted. He is not diaphoretic. No erythema. Psychiatric: He has a normal mood and affect. His behavior is normal.   Nursing note and vitals reviewed. MDM  Number of Diagnoses or Management Options  Diagnosis management comments: ddx includes gastroenteritis, pancreatitis, gastritis Patient xray and labs are normal.  hhe is feeling somewhat better on reevaluation. He wants to go home I discussed CT scan would be needed to definitively rule anything out further and to return if he gets worse. Nikky Mcdaniel MD; 7/3/2018 @7:12 PM Voice dictation software was used during the making of this note. This software is not perfect and grammatical and other typographical errors may be present.   This note has not been proofread for errors.  ===================================================================         ED Course       Procedures

## 2018-09-04 ENCOUNTER — HOSPITAL ENCOUNTER (EMERGENCY)
Age: 58
Discharge: HOME | End: 2018-09-04
Attending: EMERGENCY MEDICINE
Payer: MEDICARE

## 2018-09-04 ENCOUNTER — APPOINTMENT (OUTPATIENT)
Dept: GENERAL RADIOLOGY | Age: 58
End: 2018-09-04
Attending: EMERGENCY MEDICINE
Payer: MEDICARE

## 2018-09-04 VITALS
RESPIRATION RATE: 18 BRPM | OXYGEN SATURATION: 98 % | TEMPERATURE: 98 F | SYSTOLIC BLOOD PRESSURE: 131 MMHG | DIASTOLIC BLOOD PRESSURE: 71 MMHG | BODY MASS INDEX: 34.15 KG/M2 | WEIGHT: 200 LBS | HEART RATE: 71 BPM | HEIGHT: 64 IN

## 2018-09-04 DIAGNOSIS — J20.9 ACUTE BRONCHITIS, UNSPECIFIED ORGANISM: Primary | ICD-10-CM

## 2018-09-04 LAB
ALBUMIN SERPL-MCNC: 3.6 G/DL (ref 3.5–5)
ALBUMIN/GLOB SERPL: 0.8 {RATIO} (ref 1.2–3.5)
ALP SERPL-CCNC: 85 U/L (ref 50–136)
ALT SERPL-CCNC: 54 U/L (ref 12–65)
ANION GAP SERPL CALC-SCNC: 9 MMOL/L (ref 7–16)
AST SERPL-CCNC: 30 U/L (ref 15–37)
BASOPHILS # BLD: 0.1 K/UL (ref 0–0.2)
BASOPHILS NFR BLD: 1 % (ref 0–2)
BILIRUB SERPL-MCNC: 0.3 MG/DL (ref 0.2–1.1)
BUN SERPL-MCNC: 8 MG/DL (ref 6–23)
CALCIUM SERPL-MCNC: 9 MG/DL (ref 8.3–10.4)
CHLORIDE SERPL-SCNC: 102 MMOL/L (ref 98–107)
CO2 SERPL-SCNC: 29 MMOL/L (ref 21–32)
CREAT SERPL-MCNC: 0.7 MG/DL (ref 0.8–1.5)
DIFFERENTIAL METHOD BLD: NORMAL
EOSINOPHIL # BLD: 0.2 K/UL (ref 0–0.8)
EOSINOPHIL NFR BLD: 2 % (ref 0.5–7.8)
ERYTHROCYTE [DISTWIDTH] IN BLOOD BY AUTOMATED COUNT: 13.2 %
FLUAV AG NPH QL IA: NEGATIVE
FLUBV AG NPH QL IA: NEGATIVE
GLOBULIN SER CALC-MCNC: 4.6 G/DL (ref 2.3–3.5)
GLUCOSE SERPL-MCNC: 97 MG/DL (ref 65–100)
HCT VFR BLD AUTO: 48.3 % (ref 41.1–50.3)
HGB BLD-MCNC: 16.3 G/DL (ref 13.6–17.2)
IMM GRANULOCYTES # BLD: 0 K/UL (ref 0–0.5)
IMM GRANULOCYTES NFR BLD AUTO: 0 % (ref 0–5)
LYMPHOCYTES # BLD: 1.9 K/UL (ref 0.5–4.6)
LYMPHOCYTES NFR BLD: 24 % (ref 13–44)
MCH RBC QN AUTO: 30.5 PG (ref 26.1–32.9)
MCHC RBC AUTO-ENTMCNC: 33.7 G/DL (ref 31.4–35)
MCV RBC AUTO: 90.4 FL (ref 79.6–97.8)
MONOCYTES # BLD: 0.7 K/UL (ref 0.1–1.3)
MONOCYTES NFR BLD: 9 % (ref 4–12)
NEUTS SEG # BLD: 5 K/UL (ref 1.7–8.2)
NEUTS SEG NFR BLD: 64 % (ref 43–78)
NRBC # BLD: 0 K/UL (ref 0–0.2)
PLATELET # BLD AUTO: 252 K/UL (ref 150–450)
PMV BLD AUTO: 9.6 FL (ref 9.4–12.3)
POTASSIUM SERPL-SCNC: 3.9 MMOL/L (ref 3.5–5.1)
PROT SERPL-MCNC: 8.2 G/DL (ref 6.3–8.2)
RBC # BLD AUTO: 5.34 M/UL (ref 4.23–5.6)
SODIUM SERPL-SCNC: 140 MMOL/L (ref 136–145)
SPECIMEN SOURCE: NORMAL
WBC # BLD AUTO: 7.9 K/UL (ref 4.3–11.1)

## 2018-09-04 PROCEDURE — 99284 EMERGENCY DEPT VISIT MOD MDM: CPT | Performed by: NURSE PRACTITIONER

## 2018-09-04 PROCEDURE — 87804 INFLUENZA ASSAY W/OPTIC: CPT

## 2018-09-04 PROCEDURE — 80053 COMPREHEN METABOLIC PANEL: CPT

## 2018-09-04 PROCEDURE — 71046 X-RAY EXAM CHEST 2 VIEWS: CPT

## 2018-09-04 PROCEDURE — 85025 COMPLETE CBC W/AUTO DIFF WBC: CPT

## 2018-09-04 PROCEDURE — 81003 URINALYSIS AUTO W/O SCOPE: CPT | Performed by: NURSE PRACTITIONER

## 2018-09-04 RX ORDER — AMOXICILLIN AND CLAVULANATE POTASSIUM 875; 125 MG/1; MG/1
1 TABLET, FILM COATED ORAL 2 TIMES DAILY
Qty: 14 TAB | Refills: 0 | Status: SHIPPED | OUTPATIENT
Start: 2018-09-04 | End: 2020-07-02

## 2018-09-04 RX ORDER — GUAIFENESIN DEXTROMETHORPHAN HYDROBROMIDE ORAL SOLUTION 10; 100 MG/5ML; MG/5ML
10 SOLUTION ORAL
Qty: 118 ML | Refills: 0 | Status: SHIPPED | OUTPATIENT
Start: 2018-09-04 | End: 2018-09-11

## 2018-09-04 NOTE — ED TRIAGE NOTES
Pt states having body aches, back pain and a cough that started Saturday night. Pt states since yesterday has not had a taste for any food. Pt states having a productive cough.

## 2018-09-04 NOTE — ED PROVIDER NOTES
HPI Comments: Patient presents with cough, congestion, generalized body aches, and fatigue for the past 3 days. He denies fever. The history is provided by the patient. Past Medical History:  
Diagnosis Date  Allergic rhinitis  Anxiety and depression  Asthma  Bronchiectasis ([address]  Claustrophobia  Constipation  Diabetes ([address]  Dyslipidemia  Dyspnea  Gall bladder disease  GERD (gastroesophageal reflux disease)  Hepatic steatosis  Hiatal hernia  History of tobacco use  Hyperlipidemia  Hypertension  Insomnia  Obesity, unspecified (BMI 30-39.9; WEIGHT MANAGEMENT)  Obstructive sleep apnea  Osteopenia  Pulmonary nodule  Sinusitis  Snoring  SOB (shortness of breath)  Urinary hesitancy Past Surgical History:  
Procedure Laterality Date  HX ABDOMINAL WALL DEFECT REPAIR  1975  
 knife wound repair  HX CHOLECYSTECTOMY  1/15/15 Family History:  
Problem Relation Age of Onset  Diabetes Mother  Diabetes Father  Diabetes Brother  Asthma Daughter Social History Social History  Marital status:  Spouse name: N/A  
 Number of children: N/A  
 Years of education: N/A Occupational History   Disabled, worked for 15 years as a  with no industrial toxin exposure Social History Main Topics  Smoking status: Former Smoker Packs/day: 0.50 Years: 10.00 Types: Cigarettes Quit date: 1/14/1995  Smokeless tobacco: Never Used Comment: QUIT ALMOST 20 YEARS AGO  Alcohol use No  
 Drug use: No  
 Sexual activity: Not on file Other Topics Concern  Not on file Social History Narrative 20-pack-year history of cigarette smoking, stopped smoking in 1995. Worked as a  for 15 years, denies industrial toxin exposure history. Presently on disability. , 2 children, one has asthma, also an adopted child. Denies alcohol use. He has lived in the [place] visiting from [facility] at age 13, lives first in [place], [place] and now in [place]. Has chickens and ducks, no history of pet bird, has pet dog. ALLERGIES: Benzonatate and Pork/porcine containing products Review of Systems Constitutional: Positive for fatigue. Negative for chills and fever. HENT: Positive for congestion. Respiratory: Positive for cough. Gastrointestinal: Negative for abdominal pain, constipation, diarrhea, nausea and vomiting. Musculoskeletal: Positive for myalgias. Negative for arthralgias. Vitals:  
 09/04/18 1304 BP: 141/77 Pulse: 78 Resp: 20 Temp: 98.7 °F (37.1 °C) SpO2: 97% Weight: 90.7 kg (200 lb) Height: 5' 4\" (1.626 m) Physical Exam  
Constitutional: He is oriented to person, place, and time. No distress. HENT:  
Right Ear: Tympanic membrane is erythematous. A middle ear effusion is present. Left Ear: Tympanic membrane is erythematous. A middle ear effusion is present. Nose: Mucosal edema present. Cardiovascular: Normal rate and regular rhythm. No murmur heard. Pulmonary/Chest: Effort normal and breath sounds normal. No accessory muscle usage. No respiratory distress. He has no decreased breath sounds. Neurological: He is alert and oriented to person, place, and time. Skin: Skin is warm and dry. He is not diaphoretic. Psychiatric: He has a normal mood and affect. His behavior is normal.  
Nursing note and vitals reviewed. Recent Results (from the past 12 hour(s)) INFLUENZA A & B AG (RAPID TEST) Collection Time: 09/04/18  1:36 PM  
Result Value Ref Range Influenza A Ag NEGATIVE  NEG Influenza B Ag NEGATIVE  NEG Source NASOPHARYNGEAL    
CBC WITH AUTOMATED DIFF Collection Time: 09/04/18  1:36 PM  
Result Value Ref Range WBC 7.9 4.3 - 11.1 K/uL RBC 5.34 4.23 - 5.6 M/uL  
 HGB 16.3 13.6 - 17.2 g/dL HCT 48.3 41.1 - 50.3 % MCV 90.4 79.6 - 97.8 FL  
 MCH 30.5 26.1 - 32.9 PG  
 MCHC 33.7 31.4 - 35.0 g/dL  
 RDW 13.2 % PLATELET 059 409 - 263 K/uL MPV 9.6 9.4 - 12.3 FL ABSOLUTE NRBC 0.00 0.0 - 0.2 K/uL  
 DF AUTOMATED NEUTROPHILS 64 43 - 78 % LYMPHOCYTES 24 13 - 44 % MONOCYTES 9 4.0 - 12.0 % EOSINOPHILS 2 0.5 - 7.8 % BASOPHILS 1 0.0 - 2.0 % IMMATURE GRANULOCYTES 0 0.0 - 5.0 %  
 ABS. NEUTROPHILS 5.0 1.7 - 8.2 K/UL  
 ABS. LYMPHOCYTES 1.9 0.5 - 4.6 K/UL  
 ABS. MONOCYTES 0.7 0.1 - 1.3 K/UL  
 ABS. EOSINOPHILS 0.2 0.0 - 0.8 K/UL  
 ABS. BASOPHILS 0.1 0.0 - 0.2 K/UL  
 ABS. IMM. GRANS. 0.0 0.0 - 0.5 K/UL METABOLIC PANEL, COMPREHENSIVE Collection Time: 09/04/18  1:36 PM  
Result Value Ref Range Sodium 140 136 - 145 mmol/L Potassium 3.9 3.5 - 5.1 mmol/L Chloride 102 98 - 107 mmol/L  
 CO2 29 21 - 32 mmol/L Anion gap 9 7 - 16 mmol/L Glucose 97 65 - 100 mg/dL BUN 8 6 - 23 MG/DL Creatinine 0.70 (L) 0.8 - 1.5 MG/DL  
 GFR est AA >60 >60 ml/min/1.73m2 GFR est non-AA >60 >60 ml/min/1.73m2 Calcium 9.0 8.3 - 10.4 MG/DL Bilirubin, total 0.3 0.2 - 1.1 MG/DL  
 ALT (SGPT) 54 12 - 65 U/L  
 AST (SGOT) 30 15 - 37 U/L Alk. phosphatase 85 50 - 136 U/L Protein, total 8.2 6.3 - 8.2 g/dL Albumin 3.6 3.5 - 5.0 g/dL Globulin 4.6 (H) 2.3 - 3.5 g/dL A-G Ratio 0.8 (L) 1.2 - 3.5 Xr Chest Pa Lat Result Date: 9/4/2018 PA AND LATERAL CHEST X-RAY. Clinical Indication: Cough and chest congestion for two days Comparison: Chest x-ray dated 2/22/2018 Findings: 2 views of the chest submitted demonstrate the cardiac silhouette and mediastinum to be unremarkable. There is no pleural effusion or pneumothorax. The lung parenchyma is clear. The included osseous structures are unremarkable. Impression: No acute cardiopulmonary abnormality. MDM Number of Diagnoses or Management Options Acute bronchitis, unspecified organism:  
Diagnosis management comments: No acute finding noted on lab results. UA negative for infection. Chest xray negative for pneumonia. Patient given prescriptions for tussin and Augmentin. Amount and/or Complexity of Data Reviewed Clinical lab tests: reviewed and ordered Tests in the radiology section of CPT®: ordered and reviewed Patient Progress Patient progress: stable ED Course Procedures

## 2018-09-04 NOTE — ED NOTES
I have reviewed discharge instructions with the patient. The patient verbalized understanding. Patient left ED via Discharge Method: ambulatory to Home with (insert name of family/friend, self, transport SPOUSE). Opportunity for questions and clarification provided. Patient given 2 scripts. To continue your aftercare when you leave the hospital, you may receive an automated call from our care team to check in on how you are doing. This is a free service and part of our promise to provide the best care and service to meet your aftercare needs.  If you have questions, or wish to unsubscribe from this service please call [phone]. Thank you for Choosing our [facility] Emergency Department.

## 2018-09-04 NOTE — DISCHARGE INSTRUCTIONS
Bronquitis: Instrucciones de cuidado - [ Bronchitis: Care Instructions ]  Instrucciones de cuidado    La bronquitis es lashell inflamación de los bronquios (conductos bronquiales), que llevan aire a los pulmones. Los tubos se hinchan y producen mucosidad, o flema. La mucosidad y los bronquios inflamados hacen que tosa. Es posible que tenga problemas para respirar. Nancyann Endo de los casos de bronquitis son causados por virus barb los que causan los resfriados. Los antibióticos generalmente no ayudan y pueden ser dañinos. La bronquitis suele aparecer con Berdie Ale y dura alrededor de 2 a 3 semanas en personas que por lo demás son saludables. La atención de seguimiento es lashell parte clave de porter tratamiento y seguridad. Asegúrese de hacer y acudir a todas las citas, y llame a porter médico si está teniendo problemas. También es lashell buena idea saber los resultados de eduarda exámenes y mantener lashell lista de los medicamentos que сергей. ¿Cómo puede cuidarse en el hogar? · [employer] medicamentos exactamente barb le fueron recetados. Llame a porter médico si laz que está teniendo un problema con eduarda medicamentos. · Descanse un poco más. · [place] un analgésico (medicamento para el dolor) de venta terri, barb acetaminofén (Tylenol), ibuprofeno (Advil, Motrin) o naproxeno (Aleve), para reducir la fiebre y [employer] sujatha en el cuerpo. Anna y siga todas las instrucciones de la [place]. · No tome dos o más analgésicos al mismo tiempo a menos que el médico se lo haya indicado. Muchos analgésicos contienen acetaminofén, es decir, Tylenol. El exceso de acetaminofén (Tylenol) puede ser dañino. · [place] un medicamento para la tos de venta terri que contenga dextrometorfano para ayudar a aliviar la tos seca y persistente y así poder dormir. Evite los medicamentos para la tos que tengan más de un ingrediente [employer]. Anna y siga todas las instrucciones de la [place].   · Respire aire húmedo de un humidificador, ducha caliente o lavabo lleno de [place]. El calor y la humedad adelgazarán la mucosidad para que pueda expulsarla. · No fume. Fumar puede empeorar la bronquitis. Si necesita ayuda para dejar de fumar, hable con porter médico sobre programas y medicamentos para dejar de fumar. Estos pueden aumentar eduarda probabilidades de dejar el hábito para siempre. ¿Cuándo debe pedir ayuda? Llame al 911 en cualquier momento que considere que necesita atención de emergencia. Por ejemplo, llame si:    · Tiene serias dificultades para respirar.    Llame a porter médico ahora mismo o busque atención médica inmediata si:    · Tiene nueva o peor dificultad para respirar.     · Tose mucosidad (esputo) de color café oscuro o con elijah.     · Tiene lashell fiebre nueva o más wandy.     · Tiene un salpullido nuevo.    Preste especial atención a los cambios en porter giovanny y asegúrese de comunicarse con porter médico si:    · Porter tos es más profunda o más frecuente que antes, especialmente si nota más mucosidad o un cambio en el color de la mucosidad.     · No mejora barb se esperaba. ¿Dónde puede encontrar más información en inglés? Ankush Briceño a http://[email or web address].info/. Escriba H333 en la búsqueda para aprender más acerca de \"Bronquitis: Instrucciones de cuidado - [ Bronchitis: Care Instructions ]. \"  Revisado: 6 naomimbre, 2017  Versión del contenido: 11.7  © 1435-4593 [employer], Incorporated. Las instrucciones de cuidado fueron adaptadas bajo licencia por [employer] (which disclaims liability or warranty for this information). Si usted tiene [place] afección médica o sobre estas instrucciones, siempre pregunte a porter profesional de giovanny. [facility] niega toda garantía o responsabilidad por porter uso de esta información.

## 2019-01-10 ENCOUNTER — APPOINTMENT (OUTPATIENT)
Dept: GENERAL RADIOLOGY | Age: 59
End: 2019-01-10
Attending: EMERGENCY MEDICINE
Payer: MEDICARE

## 2019-01-10 ENCOUNTER — HOSPITAL ENCOUNTER (EMERGENCY)
Age: 59
Discharge: HOME | End: 2019-01-10
Attending: EMERGENCY MEDICINE
Payer: MEDICARE

## 2019-01-10 VITALS
HEART RATE: 90 BPM | OXYGEN SATURATION: 96 % | HEIGHT: 64 IN | DIASTOLIC BLOOD PRESSURE: 87 MMHG | WEIGHT: 185 LBS | BODY MASS INDEX: 31.58 KG/M2 | RESPIRATION RATE: 18 BRPM | TEMPERATURE: 100 F | SYSTOLIC BLOOD PRESSURE: 128 MMHG

## 2019-01-10 DIAGNOSIS — J10.1 INFLUENZA A: Primary | ICD-10-CM

## 2019-01-10 LAB
FLUAV AG NPH QL IA: POSITIVE
FLUBV AG NPH QL IA: NEGATIVE
GLUCOSE BLD STRIP.AUTO-MCNC: 111 MG/DL (ref 65–100)
SPECIMEN SOURCE: ABNORMAL

## 2019-01-10 PROCEDURE — 74011250637 HC RX REV CODE- 250/637: Performed by: EMERGENCY MEDICINE

## 2019-01-10 PROCEDURE — 99284 EMERGENCY DEPT VISIT MOD MDM: CPT | Performed by: EMERGENCY MEDICINE

## 2019-01-10 PROCEDURE — 71046 X-RAY EXAM CHEST 2 VIEWS: CPT

## 2019-01-10 PROCEDURE — 82962 GLUCOSE BLOOD TEST: CPT

## 2019-01-10 PROCEDURE — 87804 INFLUENZA ASSAY W/OPTIC: CPT

## 2019-01-10 RX ORDER — ACETAMINOPHEN 325 MG/1
650 TABLET ORAL
Status: COMPLETED | OUTPATIENT
Start: 2019-01-10 | End: 2019-01-10

## 2019-01-10 RX ORDER — OSELTAMIVIR PHOSPHATE 75 MG/1
75 CAPSULE ORAL 2 TIMES DAILY
Qty: 10 CAP | Refills: 0 | Status: SHIPPED | OUTPATIENT
Start: 2019-01-10 | End: 2019-01-15

## 2019-01-10 RX ADMIN — ACETAMINOPHEN 650 MG: 325 TABLET ORAL at 18:21

## 2019-01-10 NOTE — DISCHARGE INSTRUCTIONS
Patient Education        Influenza (gripe): Instrucciones de cuidado - [ Influenza (Flu): Care Instructions ]  Instrucciones de cuidado    La influenza (gripe) es lashell infección de los pulmones y las vías respiratorias. Es causada por el virus de la influenza. Hay diferentes cepas o tipos de virus de la gripe de un año a otro. A diferencia del resfriado común, la gripe se presenta de [place] repentina y [address], tales barb tos, [place], [place], escalofríos, fatiga y [place], son más intensos. Estos síntomas pueden durar hasta 10 días. Aunque la gripe puede hacerle sentirse muy enfermo, por lo general no causa problemas serios de giovanny. Por lo general, todo lo que necesita para los síntomas de la gripe es tratamiento en casa. Alina porter médico podría recetarle algún medicamento antiviral para prevenir otros problemas de giovanny, barb la neumonía. Las [employer] y quienes tienen un problema de giovanny prolongado, barb lashell enfermedad pulmonar, tienen el mayor riesgo de desarrollar neumonía u otros problemas de [place]. La atención de seguimiento es lashell parte clave de porter tratamiento y seguridad. Asegúrese de hacer y acudir a todas las citas, y llame a porter médico si está teniendo problemas. También es lashell buena idea saber los resultados de eduarda exámenes y mantener lashell lista de los medicamentos que сергей. ¿Cómo puede cuidarse en el hogar? · Descanse bastante. · Kat abundantes líquidos, suficientes para que porter orina sea de color amarillo marin o transparente barb el agua. Si tiene lashell enfermedad del riñón, del corazón o del hígado y tiene que Sue's líquidos, hable con porter médico antes de aumentar porter consumo. · Si es necesario, tome un analgésico (medicamento para el dolor) de venta terri, barb acetaminofén (Tylenol), ibuprofeno (Advil, Motrin) o naproxeno (Aleve), para [employer] fiebre, el dolor de [place] y los sujatha musculares. Anna y siga todas las instrucciones de la [place].  Ninguna persona skip de 20 años debe gene aspirina. Ésta ha sido relacionada con el síndrome de [place], lashell enfermedad grave. · No fume. Fumar puede empeorar la gripe. Si necesita ayuda para dejar de fumar, hable con porter médico [employer] y medicamentos para dejar de fumar. Éstos pueden aumentar eduarda probabilidades de dejar el hábito para siempre. · Para ayudar a despejar la nariz congestionada, respire aire húmedo de [place] caliente o un lavabo lleno de [place]. · Antes de usar medicamentos para la tos y los resfriados, revise la [place]. Estos medicamentos podrían no ser seguros para los niños pequeños o las personas con ciertos problemas de [place]. · Si le duele la piel alrededor de la nariz y los labios, aplique un poco de vaselina en la mena. · Para aliviar la tos:  ? Kat líquidos para aliviar la comezón de garganta. ? Chupe pastillas para la tos o caramelos duros comunes. ? [employer] para la tos de venta terri que contenga dextrometorfano para ayudarle a dormir. Anna y siga todas las instrucciones de la [place]. ? Use lashell almohada extra en la noche para levantar más la morena. [place] podría ayudarlo a descansar si la tos lo mantiene despierto. · [employer] medicamentos recetados exactamente barb le fueron recetados. Llame a porter médico si laz estar teniendo problemas con porter medicamento. Cómo evitar propagar la gripe  · [employer] nancy con regularidad y manténgalas alejadas de porter miguel. · No vaya a la escuela, al [place] o a otros lugares públicos hasta que se sienta mejor y porter fiebre haya desaparecido por al menos 24 horas. La fiebre debe carol desaparecido por sí misma, sin la ayuda de medicamentos. · Pida a las personas que viven con usted que hablen con eduarda médicos sobre la prevención de la gripe. Atnoine vez les den algún medicamento antiviral para no contraer porter gripe. · Para prevenir tener la gripe en el futuro, hágase poner la vacuna contra la gripe cada otoño.  Anime a las personas que viven en porter casa a ponerse la vacuna. · Cúbrase la boca al toser o estornudar. ¿Cuándo debe pedir ayuda? Llame al 911 en cualquier momento que considere que necesita atención de emergencia. Por ejemplo, llame si:    · Tiene serias dificultades para respirar.    Llame a porter médico ahora mismo o busque atención médica inmediata si:    · Tiene nueva o mayor dificultad para respirar.     · Le parece que está mucho más enfermo.     · Se siente muy somnoliento (con sueño) o confuso.     · Tiene fiebre nueva o más wandy.     · Tiene un salpullido nuevo.    Preste especial atención a los cambios en porter giovanny y asegúrese de comunicarse con porter médico si:    · Terie Prom a mejorar y después empeora otra vez.     · No está mejorando después de 1 semana. ¿Dónde puede encontrar más información en inglés? Destiny Kevona a http://[email or web address].[email or web address] Sagrario [id number] en la búsqueda para aprender más acerca de \"Influenza (gripe): Instrucciones de cuidado - [ Influenza (Flu): Care Instructions ]. \"  Revisado: 6 diciembre, 2017  Versión del contenido: 11.8  © 20061822-9970 Healthwise, Incorporated. Las instrucciones de cuidado fueron adaptadas bajo licencia por [employer] (which disclaims liability or warranty for this information). Si usted tiene [place] afección médica o sobre estas instrucciones, siempre pregunte a porter profesional de giovanny. Healthwise, Incorporated niega toda garantía o responsabilidad por porter uso de esta información. Aprenda sobre la fiebre - [ Learning About Fever ]  ¿Qué es la fiebre? La fiebre es lashell temperatura corporal wandy. Es lashell de las [employer] que el cuerpo combate enfermedades. La fiebre indica que el cuerpo está reaccionando a lashell infección o a otras enfermedades, tanto leves barb graves. La fiebre es un síntoma, no lashell enfermedad en sí misma. La fiebre puede ser lashell señal de que usted está enfermo, zoltan la mayoría de las fiebres no están causadas por un problema grave.   Es posible que usted tenga fiebre con lashell enfermedad de skip importancia, barb un resfriado. Alina, en ocasiones, lashell infección muy grave puede causar poca o nada de fiebre. Es importante considerar otros síntomas, otras afecciones que usted tenga y cómo se siente usted en general. En niños, preste atención a porter comportamiento y a los síntomas de los que se [place]. ¿Cuál es la temperatura normal del cuerpo? Lashell temperatura corporal normal es de 98.6°F (37°C), aproximadamente. Algunas personas tienen lashell temperatura normal que es un poco más wandy o algo más baja que esta. Porter temperatura puede ser un poco más baja en la mañana que más tarde en el día. Podría elevarse cuando hace ejercicio, lleva ropa gruesa, сергей un baño caliente o cuando hace calor. Porter temperatura también puede ser diferente dependiendo de cómo la mida. Si mide la temperatura en la boca (oral) o en la axila, puede ser algo más baja que la temperatura central (rectal). ¿Qué es lashell temperatura de fiebre? Lashell temperatura central de 100.4°F (38°C) o superior se considera fiebre. ¿Qué puede causar la fiebre? La fiebre puede ser causada por:  · Infecciones. Esta es la causa más común de la [place]. Los ejemplos de infecciones que pueden provocar fiebre incluyen la gripe, lashell infección de los riñones o la neumonía. · Algunos medicamentos. · Traumatismos o lesiones graves, barb un ataque al corazón, un ataque cerebral, insolación o quemaduras. · Otras afecciones médicas, barb artritis y algunos tipos de cáncer. ¿Cómo puede tratar la fiebre en el hogar? · [place] a porter médico si puede gene un analgésico (medicamento para el dolor) de venta terri, barb acetaminofén (Tylenol), ibuprofeno (Advil, Motrin) o naproxeno (Aleve). Sea raymond con los medicamentos. Anna y siga todas las instrucciones de la [place]. · Kat abundantes líquidos para prevenir la deshidratación. Opte por beber agua y otros líquidos mag sin cafeína hasta que se sienta mejor.  Si tiene lashell enfermedad renal, cardíaca o hepática y tiene que restringir los líquidos, hable con porter médico antes de aumentar la cantidad de líquido que narayan. La atención de seguimiento es lashell parte clave de porter tratamiento y seguridad. Asegúrese de hacer y acudir a todas las citas, y llame a porter médico si está teniendo problemas. También es lashell buena idea saber los resultados de eduarda exámenes y mantener lashell lista de los medicamentos que сергей. ¿Dónde puede encontrar más información en inglés? Destiny Andrez a http://[email or web address].info/. Sagrario Martin [id number] en la búsqueda para aprender más acerca de \"Aprenda sobre la [place] - [ Learning About Fever ]. \"  Revisado: 20 noviembre, 2017  Versión del contenido: 11.8  © 0502-8838 Healthwise, Incorporated. Las instrucciones de cuidado fueron adaptadas bajo licencia por Good Help Connections (which disclaims liability or warranty for this information). Si usted tiene [place] afección médica o sobre estas instrucciones, siempre pregunte a porter profesional de giovanny.  Healthwise, Incorporated niega toda garantía o responsabilidad por porter uso de esta información.

## 2019-01-10 NOTE — ED PROVIDER NOTES
70-year-old male presents with complaint of body aches, subjective fever, rhinorrhea, nasal congestion, and diffuse generalized headache since yesterday. States recent contact with sick grandchildren. Denies neck pain, back pain, chest pain, shortness of breath, productive cough, sore throat, diarrhea, constipation, rash, abdominal pain, urinary symptoms. States he \"may have received flu shot\". The history is provided by the patient. The history is limited by a language barrier. A  was used. Nasal Congestion This is a new problem. The current episode started yesterday. The problem occurs constantly. The problem has not changed since onset. Associated symptoms include headaches. Pertinent negatives include no chest pain, no abdominal pain and no shortness of breath. Nothing aggravates the symptoms. Nothing relieves the symptoms. He has tried nothing for the symptoms. The treatment provided no relief. Past Medical History:  
Diagnosis Date  Allergic rhinitis  Anxiety and depression  Asthma  Bronchiectasis ([address]  Claustrophobia  Constipation  Diabetes ([address]  Dyslipidemia  Dyspnea  Gall bladder disease  GERD (gastroesophageal reflux disease)  Hepatic steatosis  Hiatal hernia  History of tobacco use  Hyperlipidemia  Hypertension  Insomnia  Obesity, unspecified (BMI 30-39.9; WEIGHT MANAGEMENT)  Obstructive sleep apnea  Osteopenia  Pulmonary nodule  Sinusitis  Snoring  SOB (shortness of breath)  Urinary hesitancy Past Surgical History:  
Procedure Laterality Date  HX ABDOMINAL WALL DEFECT REPAIR  1975  
 knife wound repair  HX CHOLECYSTECTOMY  1/15/15 Family History:  
Problem Relation Age of Onset  Diabetes Mother  Diabetes Father  Diabetes Brother  Asthma Daughter Social History Socioeconomic History  Marital status:   
 Spouse name: Not on file  Number of children: Not on file  Years of education: Not on file  Highest education level: Not on file Social Needs  Financial resource strain: Not on file  Food insecurity - worry: Not on file  Food insecurity - inability: Not on file  Transportation needs - medical: Not on file  Transportation needs - non-medical: Not on file Occupational History  Occupation:  Comment: Disabled, worked for 15 years as a  with no industrial toxin exposure Tobacco Use  Smoking status: Former Smoker Packs/day: 0.50 Years: 10.00 Pack years: 5.00 Types: Cigarettes Last attempt to quit: 1995 Years since quittin.0  Smokeless tobacco: Never Used  Tobacco comment: QUIT ALMOST 20 YEARS AGO Substance and Sexual Activity  Alcohol use: No  
 Drug use: No  
 Sexual activity: Not on file Other Topics Concern  Not on file Social History Narrative 20-pack-year history of cigarette smoking, stopped smoking in . Worked as a  for 15 years, denies industrial toxin exposure history. Presently on disability. , 2 children, one has asthma, also an adopted child. Denies alcohol use. He has lived in the [place] visiting from [facility] at age 13, lives first in [place], [place] and now in [place]. Has chickens and ducks, no history of pet bird, has pet dog. ALLERGIES: Benzonatate and Pork/porcine containing products Review of Systems Constitutional: Positive for chills and fever. HENT: Positive for congestion and rhinorrhea. Negative for sore throat, trouble swallowing and voice change. Respiratory: Negative for cough and shortness of breath. Cardiovascular: Negative for chest pain. Gastrointestinal: Negative for abdominal pain, nausea and vomiting. Genitourinary: Negative for dysuria and flank pain. Musculoskeletal: Positive for myalgias. Negative for neck pain and neck stiffness. Skin: Negative for rash and wound. Neurological: Positive for headaches. Negative for dizziness, seizures, weakness, light-headedness and numbness. Vitals:  
 01/10/19 1518 01/10/19 1742 BP: 109/73 110/65 Pulse: 87 Resp: 20 18 Temp: 100.3 °F (37.9 °C) 100.2 °F (37.9 °C) SpO2: 96% Weight: 83.9 kg (185 lb) Height: 5' 4\" (1.626 m) Physical Exam  
Constitutional: He is oriented to person, place, and time. He appears well-developed. Patient well-appearing and in no acute distress. HENT:  
Head: Normocephalic. MMM. No tonsillar erythema or exudate noted. Uvula midline. Eyes: Conjunctivae and EOM are normal. Pupils are equal, round, and reactive to light. Neck: Neck supple. No JVD present. FROM. Cardiovascular: Normal rate, regular rhythm and normal heart sounds. Radial pulses 2+ and equal bilaterally. Pulmonary/Chest: Effort normal.  
CTAB. No wheezes, rhonchi, or rales. Abdominal: Soft. Bowel sounds are normal. He exhibits no distension. There is no tenderness. Soft, NTND. No CVAT. No rebound or guarding. Musculoskeletal: FROM of all extremities. Neurological: He is alert and oriented to person, place, and time. No cranial nerve deficit or sensory deficit. Strength 5/5 throughout. Normal sensory exam.  No facial droop. No dysarthria. No meningismus or nuchal rigidity. Skin: Skin is warm and dry. No rash. Psychiatric: He has a normal mood and affect. Nursing note and vitals reviewed. MDM Number of Diagnoses or Management Options Influenza A: new and requires workup Diagnosis management comments: Patient given Tylenol. CT head negative. Influenza a positive. POC glucose 111. Patient nontoxic in appearance.  
Family and patient state that they do not want further imaging or labs obtained at this time. Patient states that they're ready for discharge home. Will prescribed use of Tamiflu given symptoms less than 72 hours. Informed of risks associated with use. Patient in agreement and verbalizes understanding. Instructed on prompt follow-up with primary care physician and given return precautions. [facility]  utilized during patient encounter. Amount and/or Complexity of Data Reviewed Clinical lab tests: ordered and reviewed Tests in the radiology section of CPT®: ordered and reviewed Review and summarize past medical records: yes Independent visualization of images, tracings, or specimens: yes Risk of Complications, Morbidity, and/or Mortality Presenting problems: low Diagnostic procedures: low Management options: low Patient Progress Patient progress: stable Procedures XR CHEST PA LAT Final Result Impression: No active disease in the chest.  
  
  
  
  
 
 
 
  
Results Include: 
 
Recent Results (from the past 24 hour(s)) INFLUENZA A & B AG (RAPID TEST) Collection Time: 01/10/19  3:25 PM  
Result Value Ref Range Influenza A Ag POSITIVE (A) NEG Influenza B Ag NEGATIVE  NEG Source NASOPHARYNGEAL Srini Vigil MD; 1/10/2019 @6:01 PM Voice dictation software was used during the making of this note. This software is not perfect and grammatical and other typographical errors may be present.   This note has not been proofread for errors. 
===================================================================

## 2019-01-10 NOTE — ED NOTES
I have reviewed discharge instructions with the patient. The patient verbalized understanding. pateint to follow up with PMD as referred and RTED with any changes/cocnerns. Patient expresses understsanding. Patient ambulatory from ED in NAD with Rx x 1.

## 2019-01-10 NOTE — ED TRIAGE NOTES
Patient has a headache and cough. Reports fever. States that this started yesterday. Patient reports that he was around his grand kids and that they were sick. Patient states that he gets nauseated.

## 2019-01-11 NOTE — ROUTINE PROCESS
present during consult with Dr. Nesha Sanabria. Yuniel Barrett CHI//  Patient Relations & Interpreting Services 
p: [phone] / [email or web address]

## 2020-07-02 ENCOUNTER — HOSPITAL ENCOUNTER (EMERGENCY)
Age: 60
Discharge: HOME | End: 2020-07-02
Attending: EMERGENCY MEDICINE

## 2020-07-02 VITALS
WEIGHT: 200 LBS | HEART RATE: 76 BPM | RESPIRATION RATE: 16 BRPM | SYSTOLIC BLOOD PRESSURE: 128 MMHG | DIASTOLIC BLOOD PRESSURE: 77 MMHG | BODY MASS INDEX: 34.15 KG/M2 | HEIGHT: 64 IN | OXYGEN SATURATION: 98 % | TEMPERATURE: 98.5 F

## 2020-07-02 DIAGNOSIS — K04.7 DENTAL ABSCESS: Primary | ICD-10-CM

## 2020-07-02 RX ORDER — AMOXICILLIN AND CLAVULANATE POTASSIUM 875; 125 MG/1; MG/1
1 TABLET, FILM COATED ORAL 2 TIMES DAILY
Qty: 14 TAB | Refills: 0 | Status: SHIPPED | OUTPATIENT
Start: 2020-07-02

## 2020-07-02 NOTE — DISCHARGE INSTRUCTIONS
Patient Education        Absceso dental: Instrucciones de cuidado  Abscessed Tooth: Care Instructions  Instrucciones de cuidado    Un absceso dental es un diente que tiene lashell bolsa de pus en los tejidos que lo rodean. El pus se forma cuando el cuerpo trata de combatir lashell infección causada por bacterias. Si el pus no puede drenar, se forma un absceso. Un absceso dental puede causar enrojecimiento e hinchazón de las encías y dolor pulsante, especialmente al [employer]. Usted podría tener un mal sabor en la boca y [place], y la mandíbula podría hincharse. El daño en un diente, caries dentales sin tratar o la enfermedad de las encías pueden causar un absceso dental.  Un absceso dental debe ser tratado por un profesional dental de inmediato. Si no se trata, la infección podría extenderse a otras partes del cuerpo. El dentista le recetará antibióticos para detener la infección. También podría hacer un orificio en el diente o cortar (con lanceta) en el interior de la boca para abrir el absceso de manera que la infección pueda drenar y así aliviar el dolor. Usted podría necesitar un tratamiento de conducto radicular, que trata de salvar el diente al extraer la pulpa infectada y reemplazarla con un medicamento curativo y/o un relleno. Si estos tratamientos no resultan efectivos, podrían extraerle el diente. La atención de seguimiento es lashell parte clave de porter tratamiento y seguridad. Asegúrese de hacer y acudir a todas las citas, y llame a porter médico si está teniendo problemas. También es lashell buena idea saber los resultados de eduarda exámenes y mantener lashell lista de los medicamentos que сергей. ¿Cómo puede cuidarse en el hogar? · Para reducir el dolor y la hinchazón de la miguel y la [place], aplíquese hielo o lashell compresa fría en la parte externa de la mejilla eun 10 a 20 minutos cada vez. Póngase un paño mcconnell entre el hielo y la piel.   · [employer] analgésicos (medicamentos para el dolor) exactamente según las indicaciones. ? Si el médico le recetó un analgésico, tómelo según las indicaciones. ? Si no está tomando un analgésico recetado, pregúntele a porter médico si puede gene honorio de [employer]. · [address] No deje de tomarlos por el hecho de sentirse mejor. Debe gene todos los antibióticos hasta terminarlos. Para prevenir un absceso dental  · Cepíllese los dientes y use hilo dental todos los días, y hágase revisiones dentales periódicas. · Consuma lashell dieta saludable y evite alimentos y bebidas azucarados. · No fume, no utilice cigarrillos electrónicos que contengan nicotina ni use tabaco para mascar. El tabaco y la nicotina retrasan la sanación. El tabaco también [address] riesgo de enfermedad de las encías y de cáncer de boca y garganta. Si necesita ayuda para dejar de fumar, hable con porter médico sobre programas y medicamentos para dejar de fumar. Pueden aumentar eduarda probabilidades de dejar el hábito para siempre. ¿Cuándo debe pedir ayuda? Llame [id number] en cualquier momento que considere que necesita atención de [place]. Por ejemplo, llame si:  · Tiene dificultad para respirar. Llame a porter médico ahora mismo o busque atención médica inmediata si:  · Tiene nuevos o peores síntomas de infección, tales barb:  ? Aumento del dolor, la hinchazón, la temperatura o el enrojecimiento. ? Vetas rojizas que salen de la mena. ? Pus que sale de la mena. ? Fiebre. Preste especial atención a los cambios en porter giovanny y asegúrese de comunicarse con porter médico si:  · No mejora barb se esperaba. ¿Dónde puede encontrar más información en inglés? Vaya a [email or web address]  Jose Carlos [id number] en la búsqueda para aprender más acerca de \"Absceso dental: Instrucciones de cuidado. \"  Revisado: 25 marzo, 2020               Versión del contenido: 12.5  © 7642-6135 [employer].    Las instrucciones de cuidado fueron adaptadas bajo licencia por [employer] (which disclaims liability or warranty for this information). Si usted tiene [employer] afección médica o sobre estas instrucciones, siempre pregunte a porter profesional de giovanny. [facility] niega toda garantía o responsabilidad por porter uso de esta información.

## 2020-07-02 NOTE — ED NOTES
I have reviewed discharge instructions with the patient. The patient verbalized understanding. Patient left ED via Discharge Method: ambulatory to Home with self. Opportunity for questions and clarification provided. Patient given 1 scripts. No e-sign. Patient wearing face mask approprietly upon discharge. To continue your aftercare when you leave the hospital, you may receive an automated call from our care team to check in on how you are doing. This is a free service and part of our promise to provide the best care and service to meet your aftercare needs.  If you have questions, or wish to unsubscribe from this service please call [phone]. Thank you for Choosing our [facility] THE [facility] Emergency Department.

## 2020-07-02 NOTE — ED TRIAGE NOTES
Pt states his tooth broke about 2 weeks ago and did not cause him any pain at that time. Pt states that he has been feeling increasing pain since this am. Pt has mask on.

## 2020-07-02 NOTE — ED PROVIDER NOTES
Patient states he broke a tooth - upper right lateral incisor - two weeks ago. Began hurting a few days ago. No fever. No facial swelling. No history of heart disease.             Past Medical History:   Diagnosis Date    Allergic rhinitis     Anxiety and depression     Asthma     Bronchiectasis ([address]     Claustrophobia     Constipation     Diabetes (HCC)     Dyslipidemia     Dyspnea     Gall bladder disease     GERD (gastroesophageal reflux disease)     Hepatic steatosis     Hiatal hernia     History of tobacco use     Hyperlipidemia     Hypertension     Insomnia     Obesity, unspecified     (BMI 30-39.9; WEIGHT MANAGEMENT)    Obstructive sleep apnea     Osteopenia     Pulmonary nodule     Sinusitis     Snoring     SOB (shortness of breath)     Urinary hesitancy        Past Surgical History:   Procedure Laterality Date    HX ABDOMINAL WALL DEFECT REPAIR      knife wound repair    HX CHOLECYSTECTOMY  1/15/15         Family History:   Problem Relation Age of Onset    Diabetes Mother     Diabetes Father     Diabetes Brother     Asthma Daughter        Social History     Socioeconomic History    Marital status:      Spouse name: Not on file    Number of children: Not on file    Years of education: Not on file    Highest education level: Not on file   Occupational History    Occupation:      Comment: Disabled, worked for 15 years as a  with no industrial toxin exposure   Social Needs    Financial resource strain: Not on file    Food insecurity     Worry: Not on file     Inability: Not on file   Forseva needs     Medical: Not on file     Non-medical: Not on file   Tobacco Use    Smoking status: Former Smoker     Packs/day: 0.50     Years: 10.00     Pack years: 5.00     Types: Cigarettes     Last attempt to quit: 1995     Years since quittin.4    Smokeless tobacco: Never Used    Tobacco comment: QUIT ALMOST 20 YEARS AGO Substance and Sexual Activity    Alcohol use: No    Drug use: No    Sexual activity: Not on file   Lifestyle    Physical activity     Days per week: Not on file     Minutes per session: Not on file    Stress: Not on file   Relationships    Social connections     Talks on phone: Not on file     Gets together: Not on file     Attends Worship service: Not on file     Active member of club or organization: Not on file     Attends meetings of clubs or organizations: Not on file     Relationship status: Not on file    Intimate partner violence     Fear of current or ex partner: Not on file     Emotionally abused: Not on file     Physically abused: Not on file     Forced sexual activity: Not on file   Other Topics Concern    Not on file   Social History Narrative    20-pack-year history of cigarette smoking, stopped smoking in 1995. Worked as a  for 15 years, denies industrial toxin exposure history. Presently on disability. , 2 children, one has asthma, also an adopted child. Denies alcohol use. He has lived in the [place] visiting from [facility] at age 13, lives first in [place], [place] and now in [place]. Has chickens and ducks, no history of pet bird, has pet dog. ALLERGIES: Benzonatate and Pork/porcine containing products    Review of Systems   Constitutional: Negative for chills and fever. HENT: Positive for dental problem. Respiratory: Negative. Cardiovascular: Negative. Gastrointestinal: Negative. Neurological: Negative. Vitals:    07/02/20 1750   BP: 123/81   Pulse: 73   Resp: 16   Temp: 98.6 °F (37 °C)   SpO2: 97%   Weight: 90.7 kg (200 lb)   Height: 5' 4\" (1.626 m)            Physical Exam  Vitals signs and nursing note reviewed. Constitutional:       Appearance: Normal appearance. HENT:      Head: Normocephalic and atraumatic. Comments: Right upper central incisor with a cavity. Pain with percussion with tongue blade. No facial swelling or redness. No gingival swelling. Mouth/Throat:      Mouth: Mucous membranes are moist.   Cardiovascular:      Rate and Rhythm: Normal rate and regular rhythm. Pulmonary:      Effort: Pulmonary effort is normal.      Breath sounds: Normal breath sounds. Neurological:      Mental Status: He is alert. MDM  Number of Diagnoses or Management Options  Dental abscess:   Diagnosis management comments: Dental abscess  RX Augmentin  875 bid x 7 days  Ibuprofen or Tylenol for pain  Ice  Follow up with dentist  Return with new or worse symptoms.     Patient Progress  Patient progress: stable         Procedures

## 2020-07-03 ENCOUNTER — PATIENT OUTREACH (OUTPATIENT)
Dept: CASE MANAGEMENT | Age: 60
End: 2020-07-03

## 2020-07-03 NOTE — PROGRESS NOTES
1st Attempt to contact patient for PATY Covid 19 risk  call, assistance from [facility] . No answer on home number and unable to leave a V/M  . Will attempt to contact patient again within 2 business days .

## 2020-07-09 ENCOUNTER — PATIENT OUTREACH (OUTPATIENT)
Dept: CASE MANAGEMENT | Age: 60
End: 2020-07-09

## 2020-07-09 NOTE — PROGRESS NOTES
2nd  attempt to contact patient for [facility] Covid 18 Call. No answer, on home number and unable to leave a V/M . Episode will be closed at this time as [address] has been unsuccessful in reaching the patient.  CC will be removed from the care team.